# Patient Record
Sex: FEMALE | Race: WHITE | NOT HISPANIC OR LATINO | Employment: FULL TIME | URBAN - METROPOLITAN AREA
[De-identification: names, ages, dates, MRNs, and addresses within clinical notes are randomized per-mention and may not be internally consistent; named-entity substitution may affect disease eponyms.]

---

## 2018-11-01 ENCOUNTER — OFFICE VISIT (OUTPATIENT)
Dept: MEDICAL GROUP | Facility: PHYSICIAN GROUP | Age: 23
End: 2018-11-01
Payer: COMMERCIAL

## 2018-11-01 VITALS
HEART RATE: 124 BPM | DIASTOLIC BLOOD PRESSURE: 68 MMHG | BODY MASS INDEX: 24.4 KG/M2 | WEIGHT: 132.6 LBS | TEMPERATURE: 98 F | SYSTOLIC BLOOD PRESSURE: 102 MMHG | OXYGEN SATURATION: 97 % | HEIGHT: 62 IN

## 2018-11-01 DIAGNOSIS — N39.0 ACUTE UTI: ICD-10-CM

## 2018-11-01 DIAGNOSIS — M54.6 ACUTE LEFT-SIDED THORACIC BACK PAIN: ICD-10-CM

## 2018-11-01 DIAGNOSIS — R30.0 DYSURIA: ICD-10-CM

## 2018-11-01 LAB
APPEARANCE UR: CLEAR
BILIRUB UR STRIP-MCNC: NORMAL MG/DL
COLOR UR AUTO: YELLOW
GLUCOSE UR STRIP.AUTO-MCNC: NORMAL MG/DL
INT CON NEG: NEGATIVE
INT CON POS: POSITIVE
KETONES UR STRIP.AUTO-MCNC: NORMAL MG/DL
LEUKOCYTE ESTERASE UR QL STRIP.AUTO: NORMAL
NITRITE UR QL STRIP.AUTO: NORMAL
PH UR STRIP.AUTO: 6 [PH] (ref 5–8)
POC URINE PREGNANCY TEST: NORMAL
PROT UR QL STRIP: NORMAL MG/DL
RBC UR QL AUTO: NORMAL
SP GR UR STRIP.AUTO: 1.01
UROBILINOGEN UR STRIP-MCNC: 0.2 MG/DL

## 2018-11-01 PROCEDURE — 81025 URINE PREGNANCY TEST: CPT | Performed by: NURSE PRACTITIONER

## 2018-11-01 PROCEDURE — 99204 OFFICE O/P NEW MOD 45 MIN: CPT | Performed by: NURSE PRACTITIONER

## 2018-11-01 PROCEDURE — 81002 URINALYSIS NONAUTO W/O SCOPE: CPT | Performed by: NURSE PRACTITIONER

## 2018-11-01 RX ORDER — NITROFURANTOIN 25; 75 MG/1; MG/1
100 CAPSULE ORAL EVERY 12 HOURS
Qty: 10 CAP | Refills: 0 | Status: SHIPPED | OUTPATIENT
Start: 2018-11-01 | End: 2018-11-01 | Stop reason: CLARIF

## 2018-11-01 RX ORDER — NITROFURANTOIN 25; 75 MG/1; MG/1
100 CAPSULE ORAL 2 TIMES DAILY
Qty: 10 CAP | Refills: 0 | Status: SHIPPED | OUTPATIENT
Start: 2018-11-01 | End: 2018-11-06

## 2018-11-01 RX ORDER — CLINDAMYCIN PHOSPHATE 10 MG/G
GEL TOPICAL 2 TIMES DAILY
COMMUNITY
End: 2023-01-30

## 2018-11-01 ASSESSMENT — PATIENT HEALTH QUESTIONNAIRE - PHQ9: CLINICAL INTERPRETATION OF PHQ2 SCORE: 0

## 2018-11-01 NOTE — ASSESSMENT & PLAN NOTE
Started 10/1. States worse with bending forward, states that it has improved. States pain is sharp constant, lasts approximately 5 minutes at an episode. States that it is worse with lifting. States she was doing a lot more lifting at work when it started. States that she has not taken medication for this issue.

## 2018-11-01 NOTE — ASSESSMENT & PLAN NOTE
"This is a new problem. Patient noticed about 3 months ago burning with urination. States that symptoms were daily, but have improved some.  states she continues to have burning with urination but \"not every time\" . States no odor. States she has had some nausea. No fevers or chills. States some cramping in her lower abdomen.   "

## 2018-11-02 NOTE — PROGRESS NOTES
"Chief Complaint   Patient presents with   • Back Pain     right side middle of the back, hurts to bend down        HISTORY OF THE PRESENT ILLNESS: This is a 23 y.o. female new patient to our practice. This pleasant patient is here today to discuss back pain and dysuria.    Acute left-sided thoracic back pain  Started 10/1. States worse with bending forward, states that it has improved. States pain is sharp constant, lasts approximately 5 minutes at an episode. States that it is worse with lifting. States she was doing a lot more lifting at work when it started. States that she has not taken medication for this issue.     Dysuria  This is a new problem. Patient noticed about 3 months ago burning with urination. States that symptoms were daily, but have improved some.  states she continues to have burning with urination but \"not every time\" . States no odor. States she has had some nausea. No fevers or chills. States some cramping in her lower abdomen.       Past Medical History:   Diagnosis Date   • Allergy    • Granular corneal dystrophy     bilateral       Past Surgical History:   Procedure Laterality Date   • DENTAL EXTRACTION(S)         Family Status   Relation Status   • Mo (Not Specified)   • Fa (Not Specified)     Family History   Problem Relation Age of Onset   • Other Mother         benign lump   • Heart Disease Father 63        MI       Social History   Substance Use Topics   • Smoking status: Never Smoker   • Smokeless tobacco: Never Used   • Alcohol use Yes      Comment: once a week        Allergies: Patient has no known allergies.    Current Outpatient Prescriptions Ordered in [facility]   Medication Sig Dispense Refill   • Etonogestrel (NEXPLANON SC) Inject  as instructed.     • clindamycin (CLEOCIN T) 1 % Gel Apply  to affected area(s) 2 times a day.     • nitrofurantoin monohydr macro (MACROBID) 100 MG Cap Take 1 Cap by mouth 2 times a day for 5 days. 10 Cap 0     No current Epic-ordered " "facility-administered medications on file.        Review of Systems   Constitutional:  Negative for fever, chills, weight loss and malaise/fatigue.   HENT:  Negative for ear pain, nosebleeds, congestion, sore throat and neck pain.    Eyes:  Negative for blurred vision.   Respiratory:  Negative for cough, sputum production, shortness of breath and wheezing.    Cardiovascular:  Negative for chest pain, palpitations, orthopnea and leg swelling.   Gastrointestinal:  Negative for heartburn, nausea, vomiting and abdominal pain.   Genitourinary: Positive for dysuria, urgency and frequency.   Musculoskeletal:  Negative for myalgias, positive back pain and negative joint pain.   Skin:  Negative for rash and itching.   Neurological:  Negative for dizziness, tingling, tremors, sensory change, focal weakness and headaches.   Endo/Heme/Allergies:  Does not bruise/bleed easily.   Psychiatric/Behavioral:  Negative for depression, anxiety, or memory loss.     All other systems reviewed and are negative except as in HPI.    Exam: Blood pressure 102/68, pulse (!) 124, temperature 36.7 °C (98 °F), height 1.575 m (5' 2\"), weight 60.1 kg (132 lb 9.6 oz), SpO2 97 %.  General:  Normal appearing. No distress.  Pulmonary:  Clear to ausculation.  Normal effort. No rales, ronchi, or wheezing.  Cardiovascular:  Regular rate and rhythm without murmur. Carotid and radial pulses are intact and equal bilaterally.  Abdomen:  Soft,  nondistended.  Mild suprapubic tenderness.  Normal bowel sounds. Liver and spleen are not palpable.  Negative CVA tenderness  Neurologic:  Grossly nonfocal  Lymph:  No cervical, supraclavicular or axillary lymph nodes are palpable  Skin:  Warm and dry.  No obvious lesions.  Musculoskeletal:  Normal gait. No extremity cyanosis, clubbing, or edema.  Negative pain to palpation, full range of motion intact  Psych:  Normal mood and affect. Alert and oriented x3. Judgment and insight is normal.    PLAN:    1.Acute left-sided " thoracic back pain  Urinalysis and pregnancy are negative  Discussed supportive measures for muscle strain including ice, ibuprofen, rest  - POCT Urinalysis  - POCT Pregnancy    3. Dysuria  4. Acute UTI  Patient continues to have dysuria, urgency, frequency, states that she has had recent episodes of diarrhea.  - nitrofurantoin monohydr macro (MACROBID) 100 MG Cap; Take 1 Cap by mouth 2 times a day for 5 days.  Dispense: 10 Cap; Refill: 0    Follow-up in 1 month to discuss diarrhea, nausea if this does not improve. Patient is encouraged to be seen in the emergency room for chest pain, palpitations, shortness of breath, dizziness, severe abdominal pain or other concerning symptoms.    Please note that this dictation was created using voice recognition software. I have made every reasonable attempt to correct obvious errors, but I expect that there are errors of grammar and possibly content that I did not discover before finalizing the note.      Assessment/Plan  1. Back pain, unspecified back location, unspecified back pain laterality, unspecified chronicity  POCT Urinalysis    POCT Pregnancy   2. Acute left-sided thoracic back pain     3. Dysuria     4. Acute UTI  nitrofurantoin monohydr macro (MACROBID) 100 MG Cap    DISCONTINUED: nitrofurantoin monohydr macro (MACROBID) 100 MG Cap         I have placed the below orders and discussed them with an approved delegating provider. The MA is performing the below orders under the direction of Dr. Weinberg.

## 2023-01-28 SDOH — ECONOMIC STABILITY: FOOD INSECURITY: WITHIN THE PAST 12 MONTHS, THE FOOD YOU BOUGHT JUST DIDN'T LAST AND YOU DIDN'T HAVE MONEY TO GET MORE.: NEVER TRUE

## 2023-01-28 SDOH — ECONOMIC STABILITY: TRANSPORTATION INSECURITY
IN THE PAST 12 MONTHS, HAS LACK OF TRANSPORTATION KEPT YOU FROM MEETINGS, WORK, OR FROM GETTING THINGS NEEDED FOR DAILY LIVING?: NO

## 2023-01-28 SDOH — ECONOMIC STABILITY: HOUSING INSECURITY: IN THE LAST 12 MONTHS, HOW MANY PLACES HAVE YOU LIVED?: 1

## 2023-01-28 SDOH — ECONOMIC STABILITY: INCOME INSECURITY: IN THE LAST 12 MONTHS, WAS THERE A TIME WHEN YOU WERE NOT ABLE TO PAY THE MORTGAGE OR RENT ON TIME?: NO

## 2023-01-28 SDOH — ECONOMIC STABILITY: TRANSPORTATION INSECURITY
IN THE PAST 12 MONTHS, HAS THE LACK OF TRANSPORTATION KEPT YOU FROM MEDICAL APPOINTMENTS OR FROM GETTING MEDICATIONS?: NO

## 2023-01-28 SDOH — ECONOMIC STABILITY: TRANSPORTATION INSECURITY
IN THE PAST 12 MONTHS, HAS LACK OF RELIABLE TRANSPORTATION KEPT YOU FROM MEDICAL APPOINTMENTS, MEETINGS, WORK OR FROM GETTING THINGS NEEDED FOR DAILY LIVING?: NO

## 2023-01-28 SDOH — HEALTH STABILITY: PHYSICAL HEALTH: ON AVERAGE, HOW MANY DAYS PER WEEK DO YOU ENGAGE IN MODERATE TO STRENUOUS EXERCISE (LIKE A BRISK WALK)?: 2 DAYS

## 2023-01-28 SDOH — ECONOMIC STABILITY: HOUSING INSECURITY
IN THE LAST 12 MONTHS, WAS THERE A TIME WHEN YOU DID NOT HAVE A STEADY PLACE TO SLEEP OR SLEPT IN A SHELTER (INCLUDING NOW)?: NO

## 2023-01-28 SDOH — ECONOMIC STABILITY: FOOD INSECURITY: WITHIN THE PAST 12 MONTHS, YOU WORRIED THAT YOUR FOOD WOULD RUN OUT BEFORE YOU GOT MONEY TO BUY MORE.: NEVER TRUE

## 2023-01-28 SDOH — ECONOMIC STABILITY: INCOME INSECURITY: HOW HARD IS IT FOR YOU TO PAY FOR THE VERY BASICS LIKE FOOD, HOUSING, MEDICAL CARE, AND HEATING?: NOT VERY HARD

## 2023-01-28 SDOH — HEALTH STABILITY: PHYSICAL HEALTH: ON AVERAGE, HOW MANY MINUTES DO YOU ENGAGE IN EXERCISE AT THIS LEVEL?: 20 MIN

## 2023-01-28 SDOH — HEALTH STABILITY: MENTAL HEALTH
STRESS IS WHEN SOMEONE FEELS TENSE, NERVOUS, ANXIOUS, OR CAN'T SLEEP AT NIGHT BECAUSE THEIR MIND IS TROUBLED. HOW STRESSED ARE YOU?: RATHER MUCH

## 2023-01-28 ASSESSMENT — SOCIAL DETERMINANTS OF HEALTH (SDOH)
IN A TYPICAL WEEK, HOW MANY TIMES DO YOU TALK ON THE PHONE WITH FAMILY, FRIENDS, OR NEIGHBORS?: NEVER
IN A TYPICAL WEEK, HOW MANY TIMES DO YOU TALK ON THE PHONE WITH FAMILY, FRIENDS, OR NEIGHBORS?: NEVER
HOW OFTEN DO YOU ATTENT MEETINGS OF THE CLUB OR ORGANIZATION YOU BELONG TO?: PATIENT DECLINED
HOW OFTEN DO YOU ATTENT MEETINGS OF THE CLUB OR ORGANIZATION YOU BELONG TO?: PATIENT DECLINED
HOW HARD IS IT FOR YOU TO PAY FOR THE VERY BASICS LIKE FOOD, HOUSING, MEDICAL CARE, AND HEATING?: NOT VERY HARD
HOW OFTEN DO YOU ATTEND CHURCH OR RELIGIOUS SERVICES?: NEVER
HOW OFTEN DO YOU GET TOGETHER WITH FRIENDS OR RELATIVES?: TWICE A WEEK
HOW OFTEN DO YOU HAVE SIX OR MORE DRINKS ON ONE OCCASION: NEVER
HOW OFTEN DO YOU HAVE A DRINK CONTAINING ALCOHOL: MONTHLY OR LESS
DO YOU BELONG TO ANY CLUBS OR ORGANIZATIONS SUCH AS CHURCH GROUPS UNIONS, FRATERNAL OR ATHLETIC GROUPS, OR SCHOOL GROUPS?: NO
DO YOU BELONG TO ANY CLUBS OR ORGANIZATIONS SUCH AS CHURCH GROUPS UNIONS, FRATERNAL OR ATHLETIC GROUPS, OR SCHOOL GROUPS?: NO
HOW OFTEN DO YOU ATTEND CHURCH OR RELIGIOUS SERVICES?: NEVER
HOW OFTEN DO YOU GET TOGETHER WITH FRIENDS OR RELATIVES?: TWICE A WEEK
WITHIN THE PAST 12 MONTHS, YOU WORRIED THAT YOUR FOOD WOULD RUN OUT BEFORE YOU GOT THE MONEY TO BUY MORE: NEVER TRUE
HOW MANY DRINKS CONTAINING ALCOHOL DO YOU HAVE ON A TYPICAL DAY WHEN YOU ARE DRINKING: 1 OR 2

## 2023-01-28 ASSESSMENT — LIFESTYLE VARIABLES
HOW MANY STANDARD DRINKS CONTAINING ALCOHOL DO YOU HAVE ON A TYPICAL DAY: 1 OR 2
SKIP TO QUESTIONS 9-10: 1
AUDIT-C TOTAL SCORE: 1
HOW OFTEN DO YOU HAVE SIX OR MORE DRINKS ON ONE OCCASION: NEVER
HOW OFTEN DO YOU HAVE A DRINK CONTAINING ALCOHOL: MONTHLY OR LESS

## 2023-01-30 ENCOUNTER — OFFICE VISIT (OUTPATIENT)
Dept: MEDICAL GROUP | Facility: PHYSICIAN GROUP | Age: 28
End: 2023-01-30
Payer: COMMERCIAL

## 2023-01-30 VITALS
TEMPERATURE: 97.8 F | OXYGEN SATURATION: 100 % | DIASTOLIC BLOOD PRESSURE: 76 MMHG | HEART RATE: 82 BPM | SYSTOLIC BLOOD PRESSURE: 112 MMHG | HEIGHT: 63 IN | BODY MASS INDEX: 24.77 KG/M2 | WEIGHT: 139.8 LBS

## 2023-01-30 DIAGNOSIS — Z00.00 WELLNESS EXAMINATION: ICD-10-CM

## 2023-01-30 DIAGNOSIS — H18.533 GRANULAR CORNEAL DYSTROPHY OF BOTH EYES: ICD-10-CM

## 2023-01-30 DIAGNOSIS — Z23 NEED FOR VACCINATION: ICD-10-CM

## 2023-01-30 PROBLEM — M54.6 ACUTE LEFT-SIDED THORACIC BACK PAIN: Status: RESOLVED | Noted: 2018-11-01 | Resolved: 2023-01-30

## 2023-01-30 PROBLEM — N39.0 ACUTE UTI: Status: RESOLVED | Noted: 2018-11-01 | Resolved: 2023-01-30

## 2023-01-30 PROBLEM — R30.0 DYSURIA: Status: RESOLVED | Noted: 2018-11-01 | Resolved: 2023-01-30

## 2023-01-30 PROCEDURE — 90686 IIV4 VACC NO PRSV 0.5 ML IM: CPT

## 2023-01-30 PROCEDURE — 99385 PREV VISIT NEW AGE 18-39: CPT | Mod: 25

## 2023-01-30 PROCEDURE — 90471 IMMUNIZATION ADMIN: CPT

## 2023-01-30 ASSESSMENT — PATIENT HEALTH QUESTIONNAIRE - PHQ9: CLINICAL INTERPRETATION OF PHQ2 SCORE: 0

## 2023-01-30 ASSESSMENT — ENCOUNTER SYMPTOMS
CONSTIPATION: 0
ABDOMINAL PAIN: 1
DIARRHEA: 0
BLOOD IN STOOL: 0

## 2023-01-30 NOTE — ASSESSMENT & PLAN NOTE
Chronic, stable. Sees provider at [facility] for monitoring/pain management with steroid eye drop as needed.

## 2023-01-30 NOTE — PROGRESS NOTES
"Subjective:     CC: Pt presents today to establish care with me. Also under the care of Dr. Snatiago OBGYIJEOMA. Reports she has been off of her OCP as her and her  are trying to conceive for about 5 months. Complains of abdominal pain, intermittent low abdomen cramping, not associated with menses. Not present today, did have one episode of abnormal BM.     HPI:   Alina presents today with    Problem   Granular Corneal Dystrophy of Both Eyes   Acute Left-Sided Thoracic Back Pain (Resolved)   Dysuria (Resolved)   Acute Uti (Resolved)       Health Maintenance: Completed  Cancer screening:   Cervical cancer screening: done within the past 3-5 years with Dr. Santiago, will request records    Infectious disease screening/Immunizaitons  -STI screening: declines  Practices safe sex.  -HIV Screening: declines  -Immunizaitons:   Influenza: 1/30/23  Tetanus: up-to-date: 6/8/29 due  Anticipatory Guidance:  Diet: Breakfast: scones and coffee, Lunch: tacos, Dinner: jambalaya  Elicits she is eating a variety of fresh veggies/fruits, elicits eating a variety of meats,   Limited fast food/junk food- once weekly  Soda: none; Water: at least 16 oz daily  Exercise: recommended 150 minutes/week; walking- works at [employer], likes swimming.  Substance Abuse: no  Safe in relationship. Yes/  Seat belts, bike helmet, gun safety discussed.  Sun protection used.    ROS:  Review of Systems   Gastrointestinal:  Positive for abdominal pain (pain worse with bearing down.). Negative for blood in stool, constipation and diarrhea.   Genitourinary:         Reports history of very heavy periods, since being off BCP, her menses has had irregular intervals, normal flow, moderate cramping.   All other systems reviewed and are negative.    Objective:     Exam:  /76 (BP Location: Left arm, Patient Position: Sitting, BP Cuff Size: Small adult)   Pulse 82   Temp 36.6 °C (97.8 °F) (Temporal)   Ht 1.595 m (5' 2.8\")   Wt 63.4 kg (139 lb 12.8 oz)  "  LMP 01/27/2023 (Approximate)   SpO2 100%   BMI 24.93 kg/m²  Body mass index is 24.93 kg/m².    Physical Exam  Vitals reviewed.   Constitutional:       General: She is not in acute distress.     Appearance: Normal appearance. She is not ill-appearing.   HENT:      Head: Normocephalic and atraumatic.      Right Ear: Tympanic membrane, ear canal and external ear normal.      Left Ear: Tympanic membrane, ear canal and external ear normal.   Eyes:      Extraocular Movements: Extraocular movements intact.      Conjunctiva/sclera: Conjunctivae normal.      Pupils: Pupils are equal, round, and reactive to light.   Neck:      Thyroid: No thyromegaly.      Trachea: Trachea normal.   Cardiovascular:      Rate and Rhythm: Normal rate and regular rhythm.      Pulses: Normal pulses.      Heart sounds: Normal heart sounds. No murmur heard.  Pulmonary:      Effort: Pulmonary effort is normal.      Breath sounds: Normal breath sounds.   Abdominal:      General: Bowel sounds are normal.      Palpations: Abdomen is soft.   Musculoskeletal:         General: No swelling, tenderness or deformity. Normal range of motion.      Cervical back: Full passive range of motion without pain.   Lymphadenopathy:      Cervical: No cervical adenopathy.   Skin:     General: Skin is warm and dry.      Capillary Refill: Capillary refill takes less than 2 seconds.   Neurological:      General: No focal deficit present.      Mental Status: She is alert and oriented to person, place, and time.      Cranial Nerves: No cranial nerve deficit.      Sensory: No sensory deficit.      Motor: No weakness.   Psychiatric:         Mood and Affect: Mood normal.         Behavior: Behavior normal.     Assessment & Plan:     27 y.o. female with the following -     Problem List Items Addressed This Visit       Granular corneal dystrophy of both eyes     Chronic, stable. Sees provider at [facility] for monitoring/pain management with steroid eye drop as needed.           Other Visit Diagnoses       Need for vaccination        Relevant Orders    INFLUENZA VACCINE QUAD INJ (PF)    Wellness examination        Relevant Orders    HEMOGLOBIN A1C    VITAMIN D,25 HYDROXY (DEFICIENCY)    TSH    Comp Metabolic Panel    CBC WITHOUT DIFFERENTIAL    Lipid Profile          I have placed the below orders and discussed them with an approved delegating provider.  The MA is performing the below orders under the direction of Dr. Gabriel.    I spent a total of 32 minutes with record review, exam, communication with the patient, communication with other providers, and documentation of this encounter.    Return in about 1 year (around 1/30/2024), or if symptoms worsen or fail to improve.    Please note that this dictation was created using voice recognition software. I have made every reasonable attempt to correct obvious errors, but I expect that there are errors of grammar and possibly content that I did not discover before finalizing the note.

## 2023-02-16 ENCOUNTER — HOSPITAL ENCOUNTER (OUTPATIENT)
Dept: LAB | Facility: MEDICAL CENTER | Age: 28
End: 2023-02-16
Payer: COMMERCIAL

## 2023-02-16 DIAGNOSIS — Z00.00 WELLNESS EXAMINATION: ICD-10-CM

## 2023-02-16 LAB
25(OH)D3 SERPL-MCNC: 22 NG/ML (ref 30–100)
ALBUMIN SERPL BCP-MCNC: 4.5 G/DL (ref 3.2–4.9)
ALBUMIN/GLOB SERPL: 1.8 G/DL
ALP SERPL-CCNC: 92 U/L (ref 30–99)
ALT SERPL-CCNC: 12 U/L (ref 2–50)
ANION GAP SERPL CALC-SCNC: 8 MMOL/L (ref 7–16)
AST SERPL-CCNC: 16 U/L (ref 12–45)
BILIRUB SERPL-MCNC: 0.9 MG/DL (ref 0.1–1.5)
BUN SERPL-MCNC: 12 MG/DL (ref 8–22)
CALCIUM ALBUM COR SERPL-MCNC: 8.9 MG/DL (ref 8.5–10.5)
CALCIUM SERPL-MCNC: 9.3 MG/DL (ref 8.5–10.5)
CHLORIDE SERPL-SCNC: 107 MMOL/L (ref 96–112)
CHOLEST SERPL-MCNC: 168 MG/DL (ref 100–199)
CO2 SERPL-SCNC: 26 MMOL/L (ref 20–33)
CREAT SERPL-MCNC: 0.55 MG/DL (ref 0.5–1.4)
ERYTHROCYTE [DISTWIDTH] IN BLOOD BY AUTOMATED COUNT: 37.7 FL (ref 35.9–50)
EST. AVERAGE GLUCOSE BLD GHB EST-MCNC: 108 MG/DL
FASTING STATUS PATIENT QL REPORTED: NORMAL
GFR SERPLBLD CREATININE-BSD FMLA CKD-EPI: 128 ML/MIN/1.73 M 2
GLOBULIN SER CALC-MCNC: 2.5 G/DL (ref 1.9–3.5)
GLUCOSE SERPL-MCNC: 79 MG/DL (ref 65–99)
HBA1C MFR BLD: 5.4 % (ref 4–5.6)
HCT VFR BLD AUTO: 45.2 % (ref 37–47)
HDLC SERPL-MCNC: 56 MG/DL
HGB BLD-MCNC: 15 G/DL (ref 12–16)
LDLC SERPL CALC-MCNC: 102 MG/DL
MCH RBC QN AUTO: 30 PG (ref 27–33)
MCHC RBC AUTO-ENTMCNC: 33.2 G/DL (ref 33.6–35)
MCV RBC AUTO: 90.4 FL (ref 81.4–97.8)
PLATELET # BLD AUTO: 234 K/UL (ref 164–446)
PMV BLD AUTO: 11.1 FL (ref 9–12.9)
POTASSIUM SERPL-SCNC: 4.7 MMOL/L (ref 3.6–5.5)
PROT SERPL-MCNC: 7 G/DL (ref 6–8.2)
RBC # BLD AUTO: 5 M/UL (ref 4.2–5.4)
SODIUM SERPL-SCNC: 141 MMOL/L (ref 135–145)
TRIGL SERPL-MCNC: 50 MG/DL (ref 0–149)
TSH SERPL DL<=0.005 MIU/L-ACNC: 0.87 UIU/ML (ref 0.38–5.33)
WBC # BLD AUTO: 5 K/UL (ref 4.8–10.8)

## 2023-02-16 PROCEDURE — 80053 COMPREHEN METABOLIC PANEL: CPT

## 2023-02-16 PROCEDURE — 85027 COMPLETE CBC AUTOMATED: CPT

## 2023-02-16 PROCEDURE — 36415 COLL VENOUS BLD VENIPUNCTURE: CPT

## 2023-02-16 PROCEDURE — 84443 ASSAY THYROID STIM HORMONE: CPT

## 2023-02-16 PROCEDURE — 83036 HEMOGLOBIN GLYCOSYLATED A1C: CPT

## 2023-02-16 PROCEDURE — 80061 LIPID PANEL: CPT

## 2023-02-16 PROCEDURE — 82306 VITAMIN D 25 HYDROXY: CPT

## 2023-06-28 ENCOUNTER — HOSPITAL ENCOUNTER (OUTPATIENT)
Facility: MEDICAL CENTER | Age: 28
End: 2023-06-28
Payer: COMMERCIAL

## 2023-07-06 ENCOUNTER — OFFICE VISIT (OUTPATIENT)
Dept: MEDICAL GROUP | Facility: PHYSICIAN GROUP | Age: 28
End: 2023-07-06
Payer: COMMERCIAL

## 2023-07-06 VITALS
TEMPERATURE: 97.6 F | OXYGEN SATURATION: 96 % | HEIGHT: 62 IN | HEART RATE: 92 BPM | WEIGHT: 139.4 LBS | DIASTOLIC BLOOD PRESSURE: 68 MMHG | BODY MASS INDEX: 25.65 KG/M2 | SYSTOLIC BLOOD PRESSURE: 112 MMHG

## 2023-07-06 DIAGNOSIS — Z3A.21 21 WEEKS GESTATION OF PREGNANCY: ICD-10-CM

## 2023-07-06 DIAGNOSIS — R11.14 BILIOUS VOMITING WITH NAUSEA: ICD-10-CM

## 2023-07-06 PROCEDURE — 99213 OFFICE O/P EST LOW 20 MIN: CPT

## 2023-07-06 PROCEDURE — 3078F DIAST BP <80 MM HG: CPT

## 2023-07-06 PROCEDURE — 3074F SYST BP LT 130 MM HG: CPT

## 2023-07-06 ASSESSMENT — ENCOUNTER SYMPTOMS
NAUSEA: 1
ABDOMINAL PAIN: 1

## 2023-07-06 ASSESSMENT — FIBROSIS 4 INDEX: FIB4 SCORE: 0.55

## 2023-07-06 NOTE — PROGRESS NOTES
"Subjective:     CC: Diagnoses of Bilious vomiting with nausea and 21 weeks gestation of pregnancy were pertinent to this visit.    HPI:   Alina presents today with    Problem   Bilious Vomiting With Nausea   21 Weeks Gestation of Pregnancy     ROS:  Review of Systems   Gastrointestinal:  Positive for abdominal pain and nausea.   All other systems reviewed and are negative.      Objective:     Exam:  /68 (BP Location: Left arm, Patient Position: Sitting, BP Cuff Size: Adult)   Pulse 92   Temp 36.4 °C (97.6 °F) (Temporal)   Ht 1.575 m (5' 2\")   Wt 63.2 kg (139 lb 6.4 oz)   LMP 01/27/2023 (Approximate)   SpO2 96%   BMI 25.50 kg/m²  Body mass index is 25.5 kg/m².    Physical Exam  Vitals reviewed.   Constitutional:       General: She is not in acute distress.     Appearance: She is not ill-appearing.   HENT:      Head: Normocephalic and atraumatic.   Cardiovascular:      Rate and Rhythm: Normal rate.      Pulses: Normal pulses.   Pulmonary:      Effort: Pulmonary effort is normal. No respiratory distress.   Abdominal:      General: Bowel sounds are normal. There is no distension.      Tenderness: There is no abdominal tenderness. There is no guarding.      Comments: Pregnancy, 21 weeks   Skin:     General: Skin is warm and dry.   Neurological:      General: No focal deficit present.      Mental Status: She is alert and oriented to person, place, and time.   Psychiatric:         Mood and Affect: Mood normal.         Behavior: Behavior normal.       Assessment & Plan:     28 y.o. female with the following -     Problem List Items Addressed This Visit       Bilious vomiting with nausea     Acute, unstable. Onset 3 days ago, one episode of vomiting entire meal, no emesis since this time, but transient nausea. Has been tolerating fluids, foods currently.   Recommend continue hydration, b-6 and doxylamine as she already has these at home from first trimester, anabella for symptom relief.   If worsening symptoms, " concern for reduced fetal movement, recommend prenatal visit/ER evaluation.         21 weeks gestation of pregnancy     Stable. Feeling movement regularly. Seeing dr. Shipman working for this. US last week.          Patient was educated in proper administration of medication(s) ordered today including safety, possible SE, risks, benefits, rationale and alternatives to therapy.   Supportive care, differential diagnoses, and indications for immediate follow-up discussed with patient.    Pathogenesis of diagnosis discussed including typical length and natural progression.    Instructed to return to clinic or nearest emergency department for any change in condition, further concerns, or worsening of symptoms.  Patient states understanding of the plan of care and discharge instructions.    Return if symptoms worsen or fail to improve.    Please note that this dictation was created using voice recognition software. I have made every reasonable attempt to correct obvious errors, but I expect that there are errors of grammar and possibly content that I did not discover before finalizing the note.

## 2023-07-06 NOTE — ASSESSMENT & PLAN NOTE
Acute, unstable. Onset 3 days ago, one episode of vomiting entire meal, no emesis since this time, but transient nausea. Has been tolerating fluids, foods currently.   Recommend continue hydration, b-6 and doxylamine as she already has these at home from first trimester, anabella for symptom relief.   If worsening symptoms, concern for reduced fetal movement, recommend prenatal visit/ER evaluation.

## 2023-12-07 ENCOUNTER — NON-PROVIDER VISIT (OUTPATIENT)
Dept: OBGYN | Facility: CLINIC | Age: 28
End: 2023-12-07
Payer: COMMERCIAL

## 2023-12-07 NOTE — PROGRESS NOTES
Summary: Alina reports baby had not lost a concerning amount of weight at his first appointment but did not gain weight by the 2 week appointment. At that time the pediatrician gave her a nipple shield and recommended pumping after breastfeeding and feeding all pumped milk back to the baby. He gained 4oz in 1 week. Now, breastfeeding every 2 hours during the day, every 3-4 hours at night. Waking baby for most feedings. Offering both breasts, up to 15 minutes on each side. Offering 2oz of formula after 3 feedings daily. No longer pumping due to feeling overwhelmed and yielding small volumes of milk. No longer using the nipple shield.     Today: Baby last fed 2 hours prior to appointment. Latched to both sides, cross cradle with ease and no nipple pain. George transferred 22mls from the right breast and 20mls from the left breast. Offered 40mls of breastmilk and formula, mother paces well. Pumped for 9 minutes, yielded 10mls total.   Plan: Breastfeed every 2-2.5 hours throughout the day, no need to wake for nighttime feedings. Offer both breasts at every feeding and top off with 1oz of formula. Discussed pumping after some feedings to determine if George is consistently transferring milk efficiently. Not recommended to pump after all feedings.     Follow up:   Lactation appointment:  December 15, 2023  Baby 's Provider appointment:  2023  Referrals: None    Maternal Diagnosis/Problem:  Lactation Suppression  and Lactation Problem  Infant Diagnosis/Problem:  Slow weight gain of     Subjective:     Alina Singh is a 28 y.o. female here for lactation care. She is here today with  babyGeorge .    Concerns:   Maternal: Weight check, Infant feeding evaluation, and Breastfeeding questions   Infant: History of slow gain    HPI:   Pertinent  history: c/section at 39 weeks    Mother does not have a history of advanced maternal age, GDM, hypertension prior to pregnancy, GHTN, insulin  resistance, multiple gestation, PCOS, thyroid disease, auto immune disease , placenta encapsulation, and breast surgery    Breast changes in pregnancy: Yes  History of breast surgeries: No    FEEDING HISTORY:    Previous Breastfeeding History: First baby.   Hospital Course: Delivered at [facility]. Reports pain and damaged nipples while in the hospital.   Currently 12/7/2023: Alina reports baby had not lost a concerning amount of weight at his first appointment but did not gain weight by the 2 week appointment. At that time the pediatrician gave her a nipple shield and recommended pumping after breastfeeding and feeding all pumped milk back to the baby. He gained 4oz in 1 week. Now, breastfeeding every 2 hours during the day, every 3-4 hours at night. Waking baby for most feedings. Offering both breasts, up to 15 minutes on each side. Offering 2oz of formula after 3 feedings daily. No longer pumping due to feeling overwhelmed and yielding small volumes of milk. No longer using the nipple shield.       Both breasts: Yes    Supplement: Formula  Quantity: 6oz / 24 hours  How given/devices: Bottle  Bottle/nipple type: Dr. Brown, Level 1    Nipple Shield Use: No longer using     Breast Pumping:  No longer pumping   Type of Pump: Medela  Flange size/type: 24mm  Wearable: No  NO pain with pumping    Maternal ROS:  Constitutional: No fever, chills. Feeling well  Breasts: No soreness of breasts and No soreness of nipples  Psychiatric: Managing ok  Mental Health: No mention of feeling irritable, agitated, angry, overwhelmed, apathetic, appetite changes, exhausted nor having sleep changes outside infant feeds/demands.  Current Outpatient Medications on File Prior to Visit   Medication Sig Dispense Refill    multivitamin Tab Take 1 Tablet by mouth every day. Eye multivitamin      Prenatal Vit-DSS-Fe Cbn-FA (PRENATAL AD PO) Take  by mouth.       No current facility-administered medications on file prior to visit.      Past  Medical History:   Diagnosis Date    Acute left-sided thoracic back pain 11/1/2018    Acute UTI 11/1/2018    Allergy     Granular corneal dystrophy     bilateral       Objective:     Maternal Physical Lactation Exam  General: No acute distress  Breasts: Symmetrical  and Soft  Nipples: intact  Psychiatric: Normal mood and affect. Her behavior is normal. Judgment and thought content normal.  Mental Health: Did NOT exhibit sadness, crying, feeling overwhelmed, agitation or hypervigilance.    Assessment/Plan & Lactation Counseling:     Infant Exam on Infant Chart    Infant Weight History:   11/05/2023: 6# 15oz  12/07/2023: 7# 3.1oz    Infant Intake at Breast:  R  22mls     L  20mls    Total: 42mls  Milk Transfer at this feeding:   Effective breastfeeding for volume available      Pumped:   Type of Pump: Medela   Quantity Pumped:   Total: 10mls  Initiation of Feeding: Infant initiates  Position of Feeding:    Right: cross cradle  Left: cross cradle  Attachment Achieved: rapidly  Nipple shield: N/A       Suck Pattern at the Breast: Suck burst and normal rest  Suck Pattern on the Bottle: Suck burst and normal rest  Behavior Following Observed Feeding: content  Nipple Pain: None     Latch: Mom latches independently  Suckling/Feeding: attaches, audible swallows, baby falls asleep, baby fed effectively, baby roots, elicits RADHA, intermittent swallows, and rhythmic  Sucking strength: Moderate Strong  Sucking Rhythm Coordinated   Compression: WNL    Once latched, baby fell into a mature and fully integrated SSB pattern.    Swallowing No difficulty noted  If functional feeding, it is quiet, rhythmic, coordinated, organized, effeicent safe, satisfying and pleasurable for both parent and baby? Yes, mostly with improvement   Milk Supply Available: low and delayed    Low Milk Supply:   Likely due to: delay in lactogenesis II and ineffective or infrequent breast stimulation or milk removal in the first 2 weeks.      MATERNAL  PERSONALIZED BREASTFEEDING PLAN  Discussed concerns and symptoms as listed above in assessment and guidance summarized below. Shared decision making was used between myself and the family for this encounter, home care, and follow up. Topics reviewed included:   4th Trimester: The 12-week period immediately after mom has had the baby. Not everyone has heard of it, but every mother and their  baby will go through it. It is a time of great physical and emotional change as the baby adjusts to being outside the womb, and the family adjusts to new life as parents  During the fourth trimester, one can expect fussiness and crying from the baby and very likely exhaustion for the family.  babies are learning to adjust to life outside the womb where it was warm and squishy!  There is a lot of misinformation about babies and their needs, and parents are often encouraged to ignore baby's signals. Bad idea. Babies are “half-baked” at birth and have much to learn with the help of physical and emotional support from caregivers. Taking care of a baby's needs is an investment that pays off with a happier, healthier child and adult.  It can take weeks or even months for your body to feel totally normal again. There is a major hormonal upheaval experienced by moms in the first few weeks after birth, because their bodies are shifting from many pregnancy hormones to a more normal hormonal make-up.  These first three months with baby earth side is a delicate time. Honor it with a mindful dose of support. Mindful Mamma's is an latha that may help.   Self-Compassion through Relational Support and Interaction.   Be kind to ourself. This is the first step in reducing anxiety and depression. Pay attention to how you are doing.   What do you need? Food, Rest, Sleep, Support, to Laugh/giggle: who will you ask today? They want to help you. We want to help you.   How do you stop your self-blame, or your self-criticism?   Get out of the  "house each day, walk or drive somewhere or ask a friend to drive you,  a \"treat\" at a drive through.   Mood and Anxiety Disorders: Having a new baby can be joyful time for some new moms, but a difficult time for others. For all, it is a time of profound physical and emotional change. Balancing baby, family, partners and friends, work, pets, and preexisting or new life stressors as well as sleep deprivation can be extremely challenging. Untreated depression, sleep exhaustion, and anxiety are each a challenge that must be addressed and in addition can contribute to early cessation of breastfeeding. It is important both for the mental health and physical health of new moms and babies to get on the path to feeling better and if therapeutic support is needed, specific resources are available.   Milk Supply is dependent on glandular tissue development, hormonal influences, how many times the baby/pump removes milk and how well the breasts are emptied in a 24 hour period. This is a biological reality that we can NOT work around. There's no magic trick, tea, food, drink, cookie or supplement that will increase your milk supply. One  must  effectively remove milk to continue to make and maximize milk. In the early days and weeks that can be 8+ times in 24 hours. For older babies, on average 6-7 + times in 24 hours.    Hydration: Staying hydrated is important however lack of hydration is usually not a cause of significantly low milk production.  Everyone needs a different amount of water, depending on their activity and diet. A high salt and/or high-protein diet, high physical activity, or very warm weather/sweating will require more fluids. A person eating a diet high in veggies and fruit, with a lack of physical activity will require fewer fluids. There is no magic number for the # of ounces of water each day.The best way to know that you are well hydrated is by looking at your urine.  Urine should look " clear to light pale yellow, and you should need to pee at least every 3 to 4 hours unless you have a large bladder capacity.  Herbs and Medications: A galactagogue is an herb or medication taken by a breastfeeding mother to increase her milk supply. We know that for centuries mothers around the world have sought out remedies to increase their milk supply. However, there is limited research on the safety and effectiveness of herbal galactagogues, which makes it hard for us to endorse them. It is not known if any of these herbs are truly effective, and it is difficult to predict how a specific herbal galactagogue will affect an individual, requiring “trial and error” in many situations. When effective, results are generally seen within 24-72 hours of starting an herbal galactagogue. Many of these herbs are used to decrease high blood sugar. If you are diabetic or have problems with low blood sugar, or thyroid disease you may not be a candidate for herbs. Not all women can increase their low milk supply with a galactagogue due to the many underlying causes of low milk production.  When taking a galactagogue, remember that frequent milk removal is still the most effective way to increase supply.   Feeding:   Infant difficulty with feeding, slow growth. Guidelines to follow:  Feed your baby every 2-2.5 hours, more often if baby acts hungry.   Awaken baby for feeding if going over 3 hours in the day.   No need to wake baby for nighttime feedings.   Daily goal: 8-12 feedings per 24 hours.   Supplement:   Supplement with expressed milk/formula  Offer 1oz after all daytime feeding  Can offer an additional .5-1oz if needed  If not breastfeeding, offer 2.5-3oz   Proper powder formula preparation: https://www.cdc.gov/nutrition/downloads/prepare-store-powered-vzevri-vjtdmmj-466.pdf.   For babies under 2 months: https://www.cdc.gov/cronobacter/infection-and-infants.html  Pacing the feeding:  A slow flow nipple helps, but how you  feed the baby is more important.  How you are  positioning the bottle can compensate for a faster flow nipple.  When bottle-feeding, the baby should control how much is consumed at a feeding.  Holding the baby in an upright position with the bottle horizontal ensures that the baby gets milk only when sucking.  Here is a nice video demonstrating this concept of paced bottle feeding,  https://www.youtube.com/watch?v=QdWNW8qYV7N Think baby led, not parent led.  Pacifier Use:  The American Academy of Pediatrics' Position Paper reports: Although we recommend a conservative approach regarding pacifier use, we do not endorse a complete ban on the use of pacifiers, nor do we support an approach that induces parental guilt concerning their choices about the use of pacifiers. Pacifier use and breastfeeding in term and  newborns- a systematic review and meta-analysis from the  J of Pediatrics Published online 2022. Has found that when pacifiers are used among individuals who have been counseled on the risks, do not interfere with breastfeeding exclusivity or duration. These are parental choices.   Pumping Guidelines:  Both breasts after some feedings  Type of Pump:  Medela  Power Pumping is only rarely indicated as the response is not significant or zero over 24 hours  How long will vary woman to woman, typically 8-15 minutes, or 1-2 minutes after flow slows  Flange Fit: Freely moving nipple in the tunnel with some movement of the areola.  Today's evaluation indicates a flange change would be appropriate, will monitor to determine if smaller flange is needed.   Caution with Breast Massage: The word “Massage” means different things in the breastfeeding world. It is described and interpreted in so many different ways and unfortunately potentially harmful. The hands can be safe on a breast and  gentle to help in many ways but they also can be damaging when used in the wrong way.  Lymphatic drainage massage  is appropriate,  open hand , lightly stroking only, and can be highly effective. The massage advice to knead , massage deeply , vibrate with marketed tools is  most concerning. Be gentle with this gland to not increase inflammation.   Storage (Acceptable guidelines for healthy term babies)  10 hours at room temp including your pieces, may rinse but not mandatory  8 days refrigerator, don't need  to refrigerate right away if using fresh pumped milk at the next feeding  Freezer 1 year  Deep freezer 2 years  American Academy or Pediatrics has said you may mix different temperature milks.   If baby drinks breast milk from a fresh or refrigerated bottle of breast milk,  you may return the unused portion to the refrigerator and use once at next feeding.   Increasing supply besides Galactogogue's and Pumping:  Warmth  Relaxation   Physical, auditory narratives  Childbirth relaxation Techniques  Acupuncture and acupressure  [facility] Acupuncture  [facility]  Shoulder Massages  Take a nap  Answers to FAQs: https://www.infantrisk.com/category/breastfeeding  Alcohol & Breastfeeding: What's your time-to-zero?  Cough & Cold Medications while Breastfeeding  Vitamin D Supplementation and Breastfeeding  Antidepressant Use While Breastfeeding: What should I know?  Breastfeeding, Caffeine, and Energy Drinks  Recommended resources:  Physicians guide to breastfeeding, must up to date and accurate information available on breastfeeding. https://physicianguidetobreastfeeding.org/  Dads  Step #1 (for Partners): If possible, arrange your life so you can engage with your baby early and often                                                                                 Step #2 (for Moms): Encourage your partner to be hands-on - and let him handle things differently.                                                                                            Step #3 (for Partners): Realize  that “your way” of parenting is essential for your child. https://doi.org/10.1093/cercor/bzgx372  Connect with other mothers:  Facebook:   [place] Breast[facility]: https://www.facebook.com/[place].breastfeeds/  Well-Nourished Babies (Private group for questions and support): https://www.facebook.com/groups/840947676125155/  Breastfeeding [place] including opportunity to weight your baby and do before and after weights   Spoiler alert!  Parenting can be really hard. There is so much to learn when it comes to caring for small humans.  It can feel lonely and unsettling.  Our group, is a parenting and feeding support group that's all about feeding/growing casimiro.   Babies welcome.   Questions expected.   We will help you find your village!    Tuesdays 10am - 11am. Women's Health at [address] [place] [address], [facility] [address] [place] [address], 3rd floor conference room  Check your baby's weight, do a feeding and see how your baby is growing, visit with other mothers, plan on a walk or coffee date after group.  Please download Growth: Baby and Child for Apple or Child Growth Tracker for [place] if you would like to  document and follow your baby's growth curve.  Due to space limitations - limit strollers please (New c/section moms please use your stroller).  We would love to have dads stay, but moms won't breastfeed if there are men in the room, sorry.  The room is generally scheduled for another event following group.  Please take all diapers with you   ONLINE SUPPORT GROUPS  [employer]partum [employer] (PSI) support groups are conducted using a lvml-hz-rlya support model and are not intended for those experiencing a mental health crisis. Groups are 90 minutes (1.5 hours) in length. The first ~30 minutes is providing information, education, and establishing group guidelines. The next ~60 minutes is “talk time,” in which group members share and talk with each other. Group members must be present for the group guidelines before joining in the discussion  or “talk time.”       In Conclusion:   Managing breastfeeding and milk supply is very dynamic,can be a complex and intimate journey, and is not one size fits all. When obstacles present themselves, it takes confidence, persistence and support. The rights of the child include optimal nutrition and mothers need help to make informed decisions. You  and your baby have been screened for biological, psychological, and social risk factors that might interfere with achieving your goals.  Support is critical. We want the family thriving not just tolerating life. You are now the focus of our Breastfeeding Medicine team; we are here to support your decisions and vision.     Follow up requires close monitoring in this time sensitive window of opportunity to establish milk supply and facilitate the learning of  breastfeeding.    Please call [phone] our voicemail line or the front office at [phone] to scheduled your next appointment.  Family is encouraged to e-mail or mychart us to update how the plan is working.    A total of 70 minutes, including infant assessment time,  was spent preparing to see the patient, obtaining and reviewing separately obtained history, performing a medically appropriate examination and evaluation, counseling and educating the family, referring and communicating with other health care professionals, documenting clinical information in the electronic health record, independently interpreting weighted feeds and infant growth results, communicating these results to the family and care coordination as detailed in the above note.       Ramonita Mancilla

## 2023-12-15 ENCOUNTER — OFFICE VISIT (OUTPATIENT)
Dept: OBGYN | Facility: CLINIC | Age: 28
End: 2023-12-15
Payer: COMMERCIAL

## 2023-12-15 DIAGNOSIS — O92.5 LACTATION SUPPRESSION: ICD-10-CM

## 2023-12-15 DIAGNOSIS — O92.70 LACTATION PROBLEM: ICD-10-CM

## 2023-12-15 PROCEDURE — 99215 OFFICE O/P EST HI 40 MIN: CPT | Performed by: NURSE PRACTITIONER

## 2023-12-15 PROCEDURE — G2212 PROLONG OUTPT/OFFICE VIS: HCPCS | Performed by: NURSE PRACTITIONER

## 2023-12-15 NOTE — PROGRESS NOTES
Summary: Baby George has struggled with weight gain since the 2 week appointment, mom went through a nipple shield, pumping and formula, trying to breastfeed along the way. She is currently breastfeeding every two hours, topping off with an ounce of formula after 6 feedings, no top off at night. Xin has started gaining well since the last lactation appointment. Mom prefers not to pump and had only been getting about 10ml after a feeding. However yesterday she pumped 3x and got a total of an ounce. Mom would like to breastfeed and not use formula. She returns to work in 6 weeks and is concerned about supply for returning to work.   Today: Mom independently latched him on the right side, in 14 minutes he removed 24ml, falling asleep.  Offered the left side for 18ml, falling asleep and then did an oral exam with some suck training and demonstrating excercises to the mom. Baby back to breast for an additional 12ml, removing a total of 1.8oz. Last appointment he removed 42ml. Pumping not indicated.   Plan: Continue breastfeeding, may either offer an ounce 5 times per day or two 2.5oz bottles and pump during those times. Pumping will help empty you better than he is currently - although clearly doing better based on good gain - and then less pumping throughout the day and breastfeed at night as doing. Baby takes one 5 hour sleep. Exercises in the note 3-5x/day one minute max each time.   Follow up:   Lactation appointment:  23 12noon  Baby 's Provider appointment: 2 Month Well Child Check  24  Referrals: None    Maternal Diagnosis/Problem:  Lactation Suppression  and Lactation Problem  Infant Diagnosis/Problem:   difficulties feeding at the breast  and Slow weight gain of  improving    Subjective:     Alina Singh is a 28 y.o. female here for lactation care. She is here today with baby.    Concerns:   Maternal: Latch on difficulties , Feeling that there is not enough milk , Weight  "check, Infant feeding evaluation, and Breastfeeding questions   Infant: Baby always seems hungry, Slow weight gain , and Infant \"tongue tied\" questions    HPI:   Pertinent  history: c/section 39.0 weeks     Mother does not have a history of advanced maternal age, GDM, hypertension prior to pregnancy, GHTN, insulin resistance, multiple gestation, PCOS, thyroid disease, auto immune disease , placenta encapsulation, and breast surgery     Breast changes in pregnancy: Yes  History of breast surgeries: No     FEEDING HISTORY:    Previous Breastfeeding History: First baby.   Hospital Course: Delivered at [facility]. Reports pain and damaged nipples while in the hospital.   Prior to consultation on 2023: Alina reports baby had not lost a concerning amount of weight at his first appointment but did not gain weight by the 2 week appointment. At that time the pediatrician gave her a nipple shield and recommended pumping after breastfeeding and feeding all pumped milk back to the baby. He gained 4oz in 1 week. Now, breastfeeding every 2 hours during the day, every 3-4 hours at night. Waking baby for most feedings. Offering both breasts, up to 15 minutes on each side. Offering 2oz of formula after 3 feedings daily. No longer pumping due to feeling overwhelmed and yielding small volumes of milk. No longer using the nipple shield.     Currently 12/15/2023  Jesus Vazquez has struggled with weight gain since the 2 week appointment, mom went through a nipple shield, pumping and formula, trying to breastfeed along the way. She is currently breastfeeding every two hours, topping off with an ounce of formula after 6 feedings, no top off at night. Xni has started gaining well since the last lactation appointment. Mom prefers not to pump and had only been getting about 10ml after a feeding. However yesterday she pumped 3x and got a total of an ounce. Mom would like to breastfeed and not use formula. She returns to " work in 6 weeks and is concerned about supply for returning to work.   Both breasts: Yes     Supplement: Formula  Quantity: 6oz / 24 hours  How given/devices: Bottle  Bottle/nipple type: Dr. Brown, Level 1     Nipple Shield Use: From ped office, no longer using      Breast Pumping:  Varies, did try yesterday after three feedings for 30ml each time  Type of Pump: Medela  Flange size/type: 24mm  Wearable: No  NO pain with pumping    Maternal ROS:  Constitutional: No fever, chills. Feeling well  Breasts: No soreness of breasts and No soreness of nipples  Psychiatric: Managing ok  Mental Health: No mention of feeling irritable, agitated, angry, overwhelmed, apathetic, appetite changes, exhausted nor having sleep changes outside infant feeds/demands.  Current Outpatient Medications on File Prior to Visit   Medication Sig Dispense Refill    multivitamin Tab Take 1 Tablet by mouth every day. Eye multivitamin      Prenatal Vit-DSS-Fe Cbn-FA (PRENATAL AD PO) Take  by mouth.       No current facility-administered medications on file prior to visit.      Past Medical History:   Diagnosis Date    Acute left-sided thoracic back pain 11/1/2018    Acute UTI 11/1/2018    Allergy     Granular corneal dystrophy     bilateral         Objective:     Maternal Physical Lactation Exam  General: No acute distress  Breasts: Symmetrical , Soft, Plugged Duct - no evidence, and Mastitis  - no S/S  Nipples: intact  Psychiatric: Normal mood and affect. Her behavior is normal. Judgment and thought content normal.  Mental Health: Did NOT exhibit sadness, crying, feeling overwhelmed, agitation or hypervigilance.    Infant Oral Lactation Exam:   Oral: Tongue lift 100%, Tongue extension over gum line, Lingual frenum appearance Coryllos type 3, Maxillary labial frenum appearance Kotlow class 2 vascular, easy lift. No tongue or lip restrictions interfering with breastfeeding  Dysfunctional suck .  Oral digital exam reveals no cupping, mostly chewing,  loses  when lip pulled down. Happy to suck on the finger.     Assessment/Plan & Lactation Counseling:     Infant Exam on Infant Chart    Infant Weight History:   11/05/2023: 6# 15oz  12/07/2023: 7# 3.1oz  12/15/2023: 8#1.0oz  14oz/8 days    Infant Intake at Breast:  Right 0.8oz  Left 0.6 + 0.4oz   Total: 1.8oz (54ml today, 42ml a week ago.)  Milk Transfer at this feeding:   increasingly effective but not 100% yet  Milk transfer impacted by    Tightness of neck and back   Neck preference    Pumped: Not indicated   Initiation of Feeding: Infant initiates  Position of Feeding:    Right: cross cradle  Left: cross cradle  Attachment Achieved: rapidly  Nipple shield: N/A      Suck Pattern at the Breast: Chewing  Suck Pattern on the Bottle: Not Indicated   Behavior Following Observed Feeding: content  Nipple Pain: None     Latch: Mom latches independently  Suckling/Feeding: attaches, baby roots, elicits RADHA, frequent pauses, and rhythmic  Sucking strength: Moderate Strong  Sucking Rhythm Coordinated   Compression: WNL    Once latched, baby did not fall fell into a mature and fully integrated SSB pattern. Rather more chewing and slightly using his cheeks   Swallowing No difficulty noted  If functional feeding, it is quiet, rhythmic, coordinated, organized, effeicent safe, satisfying and pleasurable for both parent and baby?  No  Milk Supply Available: Building    Low Milk Supply:   Likely due to: delay in lactogenesis II and ineffective or infrequent breast stimulation or milk removal    MATERNAL PERSONALIZED BREASTFEEDING PLAN  Discussed concerns and symptoms as listed above in assessment and guidance summarized below. Shared decision making was used between myself and the family for this encounter, home care, and follow up. Topics reviewed included:   The nature of infants oral head/neck structure and function and its impact on latch and transfer of milk.   Discussed The Oral and Written Language Scales, Second  Edition (OWLS II) is a norm-referenced comprehensive assessment of language with a mean score of 100 and a standard deviation of 15, scores between 85 and 115 are considered within normal limits. OWLS II includes four scales: listening comprehension (LC), oral expression (OE), reading comprehension (RC), and written comprehension (WC) to identify areas of strengths and weaknesses in oral and written expression of clients aged 3-21.  Unilateral neck rotation is a normal  finding that should correct itself over the next few weeks.    However when there is a neck preference it can make latching more difficult.    Infant head can be supported in the car seat.    Bottle feeding baby's can be encouraged to look to the opposite side of the preference  Infant can be can talked to from the side encouraging baby to turn to.   Milk Supply is dependent on glandular tissue development, hormonal influences, how many times the baby removes milk and how well the breasts are emptied in a 24 hour period. This is a biological reality that we can NOT work around. If, for any reason, your baby is not latching, or you are not able to nurse, then it is important for you to remove the milk instead by pumping or hand expression.  There's no magic trick, tea, food, drink, cookie or supplement that will increase your milk supply. One  must  effectively remove milk to continue to make and maximize milk. In the early days and weeks that can be 8+ times in 24 hours. For older babies, on average 6-7 + times in 24 hours.    Hydration: Staying hydrated is important however lack of hydration is usually not a cause of significantly low milk production.  Everyone needs a different amount of water, depending on their activity and diet. A high salt and/or high-protein diet, high physical activity, or very warm weather/sweating will require more fluids. A person eating a diet high in veggies and fruit, with a lack of physical activity will require  fewer fluids. There is no magic number for the # of ounces of water each day.The best way to know that you are well hydrated is by looking at your urine.  Urine should look clear to light pale yellow, and you should need to pee at least every 3 to 4 hours unless you have a large bladder capacity.  Herbs and Medications: A galactagogue is an herb or medication taken by a breastfeeding mother to increase her milk suply. We know that for centuries mothers around the world have sought out remedies to increase their milk supply. However, there is limited research on the safety and effectiveness of herbal galactagogues, which makes it hard for us to endorse them. It is not known if any of these herbs are truly effective, and it is difficult to predict how a specific herbal galactagogue will affect an individual, requiring “trial and error” in many situations. When effective, results are generally seen within 24-72 hours of starting an herbal galactagogue. Many of these herbs are used to decrease high blood sugar. If you are diabetic or have problems with low blood sugar, or thyroid disease you may not be a candidate for herbs. Not all women can increase their low milk supply with a galactagogue due to the many underlying causes of low milk production.  When taking a galactagogue, remember that frequent milk removal is still the most effective way to increase supply.   Feeding:   Infant feeding well given current interval growth, guidelines to follow:  Feed your baby every 1.5-3 hours, more often if baby acts hungry.   Awaken baby for feeding if going over 3 hours in the day.    Daily goal: 8-12 feedings per 24 hours.    Given infants weight you may allow baby to go longer at night but that generally means shorter durations in the day.  Strategies to facilitate feedings   Suck training Have baby suck on your clean finger (nail down) pad of finger in the palate  Introduce slowly, avoid gagging.  Press up slightly in the  "palate and pull finger toward the front of the mouth, not all the way out When baby sucks, use \"tug of war\" to provide resistance.    Sometimes our work is to disrupt old patterns so that new and more functional patterns can be established.  STRATEGIES to reduce tightness and facilitate tongue use    At the breast and/or  bottle;               Tug of war and then pull the lip down to encourage the tongue to cup and move forward              Peace sign at the breast with traction or single traction  Lip Massage [email or web address]  Biting  Tight lips  Supplement:   Supplement with expressed milk  5oz per 24 hours  2 bottles or an ounce after 6 feedings  Supplement with formula   Proper powder formula preparation: https://www.cdc.gov/nutrition/downloads/prepare-store-powered-sgleuw-mnveykr-898.pdf.   For babies under 2 months: https://www.cdc.gov/cronobacter/infection-and-infants.html  Pumping Guidelines:  Both breasts 2 times in 24 hours, feeding bottle instead of breastfeeding  Type of Pump:  Medela  Power Pumping is only rarely indicated as the response is not significant or zero over 24 hours  How long will vary woman to woman, typically 8-15 minutes, or 1-2 minutes after flow slows  Flange Fit: Freely moving nipple in the tunnel with some movement of the areola.  Increasing supply besides Galactogogue's and Pumping:  Warmth  Relaxation   Physical, auditory narratives  Childbirth relaxation Techniques  Acupuncture and acupressure  Gustavo Hawley Symmetry Acupuncture  Answers to FAQs: [email or web address]  Alcohol & Breastfeeding: What's your time-to-zero?  Cough & Cold Medications while Breastfeeding  Vitamin D Supplementation and Breastfeeding  Antidepressant Use While Breastfeeding: What should I know?  Breastfeeding, Caffeine, and Energy Drinks  Recommended resources:  Physicians guide to breastfeeding, must up to date and accurate information available on " breastfeeding. https://physicianguidetobreastfeeding.org/  Connect with other mothers: Mom attending Biggest Baby group, this is an option  Facebook:   [place] Breastfeeds: https://www.facebook.com/[place].breastfeeds/  Well-Nourished Babies (Private group for questions and support): https://www.facebook.com/groups/638946999896025/  Breastfeeding Spencer including opportunity to weight your baby and do before and after weights   Spoiler alert!  Parenting can be really hard. There is so much to learn when it comes to caring for small humans.  It can feel lonely and unsettling.  Our group, is a parenting and feeding support group that's all about feeding/growing casimiro.   Babies welcome.   Questions expected.   We will help you find your village!    Tuesdays 10am - 11am. Women's Health at [address] [place] [address], [address] [place] [address], 3rd floor conference room  Check your baby's weight, do a feeding and see how your baby is growing, visit with other mothers, plan on a walk or coffee date after group.  Please download Growth: Baby and Child for [employer] or Child Growth Tracker for [employer] if you would like to  document and follow your baby's growth curve.  Due to space limitations - limit strollers please (New c/section moms please use your stroller).  We would love to have dads stay, but moms won't breastfeed if there are men in the room, sorry.  The room is generally scheduled for another event following group.  Please take all diapers with you   ONLINE SUPPORT GROUPS  [employer] (PSI) support groups are conducted using a vnnj-at-bmii support model and are not intended for those experiencing a mental health crisis. Groups are 90 minutes (1.5 hours) in length. The first ~30 minutes is providing information, education, and establishing group guidelines. The next ~60 minutes is “talk time,” in which group members share and talk with each other. Group members must be present for the group guidelines before joining in  the discussion or “talk time.”     In Conclusion:   Managing breastfeeding and milk supply is very dynamic,can be a complex and intimate journey, and is not one size fits all. When obstacles present themselves, it takes confidence, persistence and support. The rights of the child include optimal nutrition and mothers need help to make informed decisions. You  and your baby have been screened for biological, psychological, and social risk factors that might interfere with achieving your goals.  Support is critical. We want the family thriving not just tolerating life. You are now the focus of our Breastfeeding Medicine team; we are here to support your decisions and vision.     Follow up requires monitoring in this time sensitive window of opportunity to maximize milk supply and facilitate the learning of  breastfeeding.    Please call [phone] our voicemail line or the front office at [phone] to scheduled your next appointment.  Family is encouraged to e-mail or mychart us to update how the plan is working.    A total of 75 minutes, not including infant assessment time,  was spent preparing to see the patient, obtaining and reviewing separately obtained history, performing a medically appropriate examination and evaluation, counseling and educating the family, documenting clinical information in the electronic health record, independently interpreting weighted feeds and infant growth results, communicating these results to the family and care coordination as detailed in the above note.       MAVERICK Morrison.

## 2023-12-26 ENCOUNTER — NON-PROVIDER VISIT (OUTPATIENT)
Dept: OBGYN | Facility: CLINIC | Age: 28
End: 2023-12-26
Payer: COMMERCIAL

## 2023-12-26 NOTE — PROGRESS NOTES
"Summary: Breastfeeding every 2-3 hours throughout the day, up to 4 hours at night. Offering both breasts, up to 10-15 minutes on each side. Pumping 1x daily, in place of breastfeeding in the late afternoon. Using that pumped milk as a top off if needed or saving if he seems content after all feedings. Offering 2 bottles of formula in place of breastfeeding, around 12pm and 3pm, sometimes offering the breast first for stimulation.   Today: Latched George to both breasts, cross cradle, transferred a total of 52mls with some fussiness. Settled once dressed.   Plan: Feed George frequently throughout the day, every 1.5-3 hours, no need to wake him for nighttime feedings. Offer both breasts, up to 10-15 minutes. Pump after first and last feeding of the day and in place of breastfeeding as desired. If not breastfeeding offer 3oz. Top off after breastfeeding as needed.     Follow up:   Lactation appointment:  2024  Baby 's Provider appointment: 2 Month Well Child Check    Referrals: None    Maternal Diagnosis/Problem:  Lactation Suppression  and Lactation Problem  Infant Diagnosis/Problem:   difficulties feeding at the breast  and Slow weight gain of  improving    Subjective:     Alina Singh is a 28 y.o. female here for lactation care. She is here today with baby.    Concerns:   Maternal: Latch on difficulties , Feeling that there is not enough milk , Weight check, Infant feeding evaluation, and Breastfeeding questions   Infant: Baby always seems hungry, Slow weight gain , and Infant \"tongue tied\" questions    HPI:   Pertinent  history: c/section 39.0 weeks     Mother does not have a history of advanced maternal age, GDM, hypertension prior to pregnancy, GHTN, insulin resistance, multiple gestation, PCOS, thyroid disease, auto immune disease , placenta encapsulation, and breast surgery     Breast changes in pregnancy: Yes  History of breast surgeries: No     FEEDING HISTORY:  "   Previous Breastfeeding History: First baby.   Hospital Course: Delivered at [facility]. Reports pain and damaged nipples while in the hospital.   Prior to consultation on 12/7/2023: Alina reports baby had not lost a concerning amount of weight at his first appointment but did not gain weight by the 2 week appointment. At that time the pediatrician gave her a nipple shield and recommended pumping after breastfeeding and feeding all pumped milk back to the baby. He gained 4oz in 1 week. Now, breastfeeding every 2 hours during the day, every 3-4 hours at night. Waking baby for most feedings. Offering both breasts, up to 15 minutes on each side. Offering 2oz of formula after 3 feedings daily. No longer pumping due to feeling overwhelmed and yielding small volumes of milk. No longer using the nipple shield.     Prior to consultation on 12/15/2023  Jesus Vazquez has struggled with weight gain since the 2 week appointment, mom went through a nipple shield, pumping and formula, trying to breastfeed along the way. She is currently breastfeeding every two hours, topping off with an ounce of formula after 6 feedings, no top off at night. Xin has started gaining well since the last lactation appointment. Mom prefers not to pump and had only been getting about 10ml after a feeding. However yesterday she pumped 3x and got a total of an ounce. Mom would like to breastfeed and not use formula. She returns to work in 6 weeks and is concerned about supply for returning to work.   Currently 12/26/2023: Breastfeeding every 2-3 hours throughout the day, up to 4 hours at night. Offering both breasts, up to 10-15 minutes on each side. Pumping 1x daily, in place of breastfeeding in the late afternoon. Using that pumped milk as a top off if needed or saving if he seems content after all feedings. Offering 2 bottles of formula in place of breastfeeding, around 12pm and 3pm, sometimes offering the breast first for stimulation.      Both breasts: Yes     Supplement: Formula  Quantity: 5oz / 24 hours  How given/devices: Bottle  Bottle/nipple type: Dr. Brown, Level 1     Nipple Shield Use: From ped office, no longer using      Breast Pumping:  Frequency: 1x  Yield: 2-3oz   Type of Pump: Medela  Flange size/type: 24mm  Wearable: No  NO pain with pumping    Maternal ROS:  Constitutional: No fever, chills. Feeling well  Breasts: No soreness of breasts and No soreness of nipples  Psychiatric: Managing ok  Mental Health: No mention of feeling irritable, agitated, angry, overwhelmed, apathetic, appetite changes, exhausted nor having sleep changes outside infant feeds/demands.  Current Outpatient Medications on File Prior to Visit   Medication Sig Dispense Refill    multivitamin Tab Take 1 Tablet by mouth every day. Eye multivitamin       No current facility-administered medications on file prior to visit.      Past Medical History:   Diagnosis Date    Acute left-sided thoracic back pain 11/1/2018    Acute UTI 11/1/2018    Allergy     Granular corneal dystrophy     bilateral         Objective:     Maternal Physical Lactation Exam  General: No acute distress  Breasts: Symmetrical , Soft, Plugged Duct - no evidence, and Mastitis  - no S/S  Nipples: intact  Psychiatric: Normal mood and affect. Her behavior is normal. Judgment and thought content normal.  Mental Health: Did NOT exhibit sadness, crying, feeling overwhelmed, agitation or hypervigilance.    Infant Oral Lactation Exam:   Oral: Tongue lift 100%, Tongue extension over gum line, Lingual frenum appearance Coryllos type 3, Maxillary labial frenum appearance Kotlow class 2 vascular, easy lift. No tongue or lip restrictions interfering with breastfeeding  Dysfunctional suck .  Oral digital exam reveals no cupping, mostly chewing, loses  when lip pulled down. Happy to suck on the finger.     Assessment/Plan & Lactation Counseling:     Infant Exam on Infant Chart    Infant Weight History:    11/05/2023: 6# 15oz  12/07/2023: 7# 3.1oz  12/26/2023: 8# 1.0oz (14oz/8 days)  12/19/2023: 8# 5.4oz (4.4oz/7 days)  12/26/2023: 9# 1.7oz (12.3oz/7 days)    Infant Intake at Breast:  L   22mls   R  26mls   L  4mls      Total: 52mls  Milk Transfer at this feeding: Mostly effective     Pumped: Not indicated   Initiation of Feeding: Infant initiates  Position of Feeding:    Right: cross cradle  Left: cross cradle  Attachment Achieved: rapidly  Nipple shield: N/A      Suck Pattern at the Breast: Chewing  Suck Pattern on the Bottle: Not Indicated   Behavior Following Observed Feeding: content  Nipple Pain: None     Latch: Mom latches independently  Suckling/Feeding: attaches, baby roots, elicits RADHA, frequent pauses, and rhythmic  Sucking strength: Moderate Strong  Sucking Rhythm Coordinated   Compression: WNL    Once latched, baby did not fall fell into a mature and fully integrated SSB pattern. Rather more chewing and slightly using his cheeks   Swallowing No difficulty noted  If functional feeding, it is quiet, rhythmic, coordinated, organized, effeicent safe, satisfying and pleasurable for both parent and baby? Yes, mostly   Milk Supply Available: Building      MATERNAL PERSONALIZED BREASTFEEDING PLAN  Discussed concerns and symptoms as listed above in assessment and guidance summarized below. Shared decision making was used between myself and the family for this encounter, home care, and follow up. Topics reviewed included:   Feeding:   Infant feeding well given current interval growth, guidelines to follow:  Feed your baby every 1.5-3 hours, more often if baby acts hungry.   Awaken baby for feeding if going over 3 hours in the day.    Daily goal: 8-12 feedings per 24 hours.    Given infants weight you may allow baby to go longer at night but that generally means shorter durations in the day.  Supplement:   Supplement with expressed milk or formula as needed  Offer after breastfeeding as needed   Offer replacement  bottles as desired, 3oz each   Pumping Guidelines:  Both breasts 2-3 times in 24 hours  After first and last feeding of the day  In place of breastfeeding as desired  Type of Pump:  Medela  Power Pumping is only rarely indicated as the response is not significant or zero over 24 hours  How long will vary woman to woman, typically 8-15 minutes, or 1-2 minutes after flow slows  Flange Fit: Freely moving nipple in the tunnel with some movement of the areola.  Increasing supply besides Galactogogue's and Pumping:  Warmth  Relaxation   Physical, auditory narratives  Childbirth relaxation Techniques  Acupuncture and acupressure  Gustavo Hawley Symmetry Acupuncture  Answers to FAQs: [email or web address]  Alcohol & Breastfeeding: What's your time-to-zero?  Cough & Cold Medications while Breastfeeding  Vitamin D Supplementation and Breastfeeding  Antidepressant Use While Breastfeeding: What should I know?  Breastfeeding, Caffeine, and Energy Drinks  Recommended resources:  Physicians guide to breastfeeding, must up to date and accurate information available on breastfeeding. [email or web address].[email or web address]  Connect with other mothers: Mom attending Biggest Baby group, this is an option  Facebook:   [place] Breastfeeds: [email or web address]  Well-Nourished Babies (Private group for questions and support): https://www.facebook.com/groups/558692102339217/  Breastfeeding [place] including opportunity to weight your baby and do before and after weights   Spoiler alert!  Parenting can be really hard. There is so much to learn when it comes to caring for small humans.  It can feel lonely and unsettling.  Our group, is a parenting and feeding support group that's all about feeding/growing casimiro.   Babies welcome.   Questions expected.   We will help you find your village!    Tuesdays 10am - 11am. [facility] at [address], [address], 3rd floor conference  room  Check your baby's weight, do a feeding and see how your baby is growing, visit with other mothers, plan on a walk or coffee date after group.  Please download Growth: Baby and Child for Apple or Child Growth Tracker for Androids if you would like to  document and follow your baby's growth curve.  Due to space limitations - limit strollers please (New c/section moms please use your stroller).  We would love to have dads stay, but moms won't breastfeed if there are men in the room, sorry.  The room is generally scheduled for another event following group.  Please take all diapers with you   ONLINE SUPPORT GROUPS  Postpartum Support International (PSI) support groups are conducted using a [id number]-nm-[id number] support model and are not intended for those experiencing a mental health crisis. Groups are 90 minutes (1.5 hours) in length. The first ~30 minutes is providing information, education, and establishing group guidelines. The next ~60 minutes is “talk time,” in which group members share and talk with each other. Group members must be present for the group guidelines before joining in the discussion or “talk time.”       Follow up requires monitoring in this time sensitive window of opportunity to maximize milk supply and facilitate the learning of  breastfeeding.    Please call [phone] our voicemail line or the front office at [phone] to scheduled your next appointment.  Family is encouraged to e-mail or mychart us to update how the plan is working.    A total of 60 minutes, including infant assessment time,  was spent preparing to see the patient, obtaining and reviewing separately obtained history, performing a medically appropriate examination and evaluation, counseling and educating the family, documenting clinical information in the electronic health record, independently interpreting weighted feeds and infant growth results, communicating these results to the family and care coordination as detailed in the  above note.       Ramonita Mancilla

## 2024-01-05 ENCOUNTER — OFFICE VISIT (OUTPATIENT)
Dept: OBGYN | Facility: CLINIC | Age: 29
End: 2024-01-05
Payer: COMMERCIAL

## 2024-01-05 DIAGNOSIS — O92.5 LACTATION SUPPRESSION: ICD-10-CM

## 2024-01-05 DIAGNOSIS — O92.70 LACTATION PROBLEM: ICD-10-CM

## 2024-01-05 PROCEDURE — 99215 OFFICE O/P EST HI 40 MIN: CPT | Performed by: NURSE PRACTITIONER

## 2024-01-05 NOTE — PROGRESS NOTES
Summary: Breastfeeding every 2-3 hours throughout the day, up to 3.5 hours at night. Offering both breasts for most feeding. Pumping in place of the first feeding of the day, yielding up to 5oz. Offering 3oz bottle in place of breastfeeding. Offering 2, 1oz bottles in the afternoon after the breast.     Today: Fed and pumping 1.5 hours prior to appointment. Latched George to both breasts, cross cradle, transferred a total of 46mls with some fussiness. Settled quickly once dressed.   Plan: Feed George frequently throughout the day, every 1.5-3 hours, no need to wake him for nighttime feedings. Offer both breasts, up to 10-15 minutes. Pump after first and last feeding of the day and in place of breastfeeding as desired. If not breastfeeding offer 3-4oz. Top off after breastfeeding as needed. Recommended to move the top off bottles to the 2 feedings before bed to see if it helps George sleep a longer stretch at night.      Follow up:   Lactation appointment:  2-3 Weeks  Baby 's Provider appointment: 2 Month Well Child Check    Referrals: None    Maternal Diagnosis/Problem:  Lactation Suppression  and Lactation Problem  Infant Diagnosis/Problem:   difficulties feeding at the breast  and Slow weight gain of  improving    Subjective:     Alina Snigh is a 28 y.o. female here for lactation care. She is here today with baby.    Concerns: Weight Check and Feeding Evaluation     HPI:   Pertinent  history: c/section 39.0 weeks     Mother does not have a history of advanced maternal age, GDM, hypertension prior to pregnancy, GHTN, insulin resistance, multiple gestation, PCOS, thyroid disease, auto immune disease , placenta encapsulation, and breast surgery     Breast changes in pregnancy: Yes  History of breast surgeries: No     FEEDING HISTORY:    Previous Breastfeeding History: First baby.   Hospital Course: Delivered at [facility]. Reports pain and damaged nipples while in the hospital.    Prior to consultation on 12/7/2023: Alina serna baby had not lost a concerning amount of weight at his first appointment but did not gain weight by the 2 week appointment. At that time the pediatrician gave her a nipple shield and recommended pumping after breastfeeding and feeding all pumped milk back to the baby. He gained 4oz in 1 week. Now, breastfeeding every 2 hours during the day, every 3-4 hours at night. Waking baby for most feedings. Offering both breasts, up to 15 minutes on each side. Offering 2oz of formula after 3 feedings daily. No longer pumping due to feeling overwhelmed and yielding small volumes of milk. No longer using the nipple shield.     Prior to consultation on 12/15/2023  Jesus Vazquez has struggled with weight gain since the 2 week appointment, mom went through a nipple shield, pumping and formula, trying to breastfeed along the way. She is currently breastfeeding every two hours, topping off with an ounce of formula after 6 feedings, no top off at night. Xin has started gaining well since the last lactation appointment. Mom prefers not to pump and had only been getting about 10ml after a feeding. However yesterday she pumped 3x and got a total of an ounce. Mom would like to breastfeed and not use formula. She returns to work in 6 weeks and is concerned about supply for returning to work.   Prior to consultation on 12/26/2023: Breastfeeding every 2-3 hours throughout the day, up to 4 hours at night. Offering both breasts, up to 10-15 minutes on each side. Pumping 1x daily, in place of breastfeeding in the late afternoon. Using that pumped milk as a top off if needed or saving if he seems content after all feedings. Offering 2 bottles of formula in place of breastfeeding, around 12pm and 3pm, sometimes offering the breast first for stimulation.   Currently 1/5/2024: Breastfeeding every 2-3 hours throughout the day, up to 3.5 hours at night. Offering both breasts for most feeding.  Pumping in place of the first feeding of the day, yielding up to 5oz. Offering 3oz bottle in place of breastfeeding. Offering 2, 1oz bottles in the afternoon after the breast.      Both breasts: Yes     Supplement: Formula  Quantity: 5oz / 24 hours  How given/devices: Bottle  Bottle/nipple type: Dr. Brown, Level 1     Nipple Shield Use: From ped office, no longer using      Breast Pumping:  Frequency: 1-2x  Yield: 5oz   Type of Pump: Medela  Flange size/type: 24mm  Wearable: No  NO pain with pumping    Maternal ROS:  Constitutional: No fever, chills. Feeling well  Breasts: No soreness of breasts and No soreness of nipples  Psychiatric: Managing ok  Mental Health: No mention of feeling irritable, agitated, angry, overwhelmed, apathetic, appetite changes, exhausted nor having sleep changes outside infant feeds/demands.  Current Outpatient Medications on File Prior to Visit   Medication Sig Dispense Refill    multivitamin Tab Take 1 Tablet by mouth every day. Eye multivitamin       No current facility-administered medications on file prior to visit.      Past Medical History:   Diagnosis Date    Acute left-sided thoracic back pain 11/1/2018    Acute UTI 11/1/2018    Allergy     Granular corneal dystrophy     bilateral         Objective:     Maternal Physical Lactation Exam  General: No acute distress  Breasts: Symmetrical , Soft, Plugged Duct - no evidence, and Mastitis  - no S/S  Nipples: intact  Psychiatric: Normal mood and affect. Her behavior is normal. Judgment and thought content normal.  Mental Health: Did NOT exhibit sadness, crying, feeling overwhelmed, agitation or hypervigilance.     Assessment/Plan & Lactation Counseling:     Infant Exam on Infant Chart    Infant Weight History:   11/05/2023: 6# 15oz  12/07/2023: 7# 3.1oz  12/26/2023: 8# 1.0oz (14oz/8 days)  12/19/2023: 8# 5.4oz (4.4oz/7 days)  12/26/2023: 9# 1.7oz (12.3oz/7 days)  01/02/2024: 9# 5oz  01/05/2024: 9# 9.3oz    Infant Intake at Breast:  R    22mls   L  24mls      Total: 46mls  Milk Transfer at this feeding: Mostly effective     Pumped: Not indicated   Initiation of Feeding: Infant initiates  Position of Feeding:    Right: cross cradle  Left: cross cradle  Attachment Achieved: rapidly  Nipple shield: N/A      Suck Pattern at the Breast: Suck Burst Normal Rest  Suck Pattern on the Bottle: Not Indicated   Behavior Following Observed Feeding: content  Nipple Pain: None     Latch: Mom latches independently  Suckling/Feeding: attaches, baby roots, elicits RADHA, frequent pauses, and rhythmic  Sucking strength: Moderate Strong  Sucking Rhythm Coordinated   Compression: WNL    Once latched, baby did not fall fell into a mature and fully integrated SSB pattern. Rather more chewing and slightly using his cheeks   Swallowing No difficulty noted  If functional feeding, it is quiet, rhythmic, coordinated, organized, effeicent safe, satisfying and pleasurable for both parent and baby? Yes, mostly   Milk Supply Available: Continues Building      MATERNAL PERSONALIZED BREASTFEEDING PLAN  Discussed concerns and symptoms as listed above in assessment and guidance summarized below. Shared decision making was used between myself and the family for this encounter, home care, and follow up. Topics reviewed included:   Feeding:   Infant feeding well given current interval growth, guidelines to follow:  Feed your baby every 1.5-3 hours, more often if baby acts hungry.   No need to wake George for nighttime feedings.     Daily goal: 8-10 feedings per 24 hours.   Supplement:   Supplement with expressed milk or formula as needed  Offer after breastfeeding as needed, 1oz   Offer replacement bottles as desired, 3-4oz    Pumping Guidelines:  Both breasts 2-3 times in 24 hours  After first and last feeding of the day  In place of breastfeeding as desired  Type of Pump:  Medela  Power Pumping is only rarely indicated as the response is not significant or zero over 24 hours  How long  will vary woman to woman, typically 8-15 minutes, or 1-2 minutes after flow slows  Flange Fit: Freely moving nipple in the tunnel with some movement of the areola.  Connect with other mothers: Mom attending Biggest Baby group, this is an option  Facebook:   [place] Breastfeeds: [email or web address]  Well-Nourished Babies (Private group for questions and support): https://www.facebook.com/groups/989048002474974/  Breastfeeding [place] including opportunity to weight your baby and do before and after weights   Spoiler alert!  Parenting can be really hard. There is so much to learn when it comes to caring for small humans.  It can feel lonely and unsettling.  Our group, is a parenting and feeding support group that's all about feeding/growing casimiro.   Babies welcome.   Questions expected.   We will help you find your village!    Tuesdays 10am - 11am. Women's Health at [address] [place] [address], [address] [place] [address], 3rd floor conference room  Check your baby's weight, do a feeding and see how your baby is growing, visit with other mothers, plan on a walk or coffee date after group.  Please download Growth: Baby and Child for Apple or Child Growth Tracker for Androids if you would like to  document and follow your baby's growth curve.  Due to space limitations - limit strollers please (New c/section moms please use your stroller).  We would love to have dads stay, but moms won't breastfeed if there are men in the room, sorry.  The room is generally scheduled for another event following group.  Please take all diapers with you   ONLINE SUPPORT GROUPS  Postpartum Support International (PSI) support groups are conducted using a mzrf-hz-avwh support model and are not intended for those experiencing a mental health crisis. Groups are 90 minutes (1.5 hours) in length. The first ~30 minutes is providing information, education, and establishing group guidelines. The next ~60 minutes is “talk time,” in which group members  share and talk with each other. Group members must be present for the group guidelines before joining in the discussion or “talk time.”       Follow up   Please call [phone] our voicemail line or the front office at [phone] to scheduled your next appointment.  Family is encouraged to e-mail or mychart us to update how the plan is working.    A total of 50 minutes, including infant assessment time,  was spent preparing to see the patient, obtaining and reviewing separately obtained history, performing a medically appropriate examination and evaluation, counseling and educating the family, documenting clinical information in the electronic health record, independently interpreting weighted feeds and infant growth results, communicating these results to the family and care coordination as detailed in the above note.       Ramonita Mancilla

## 2024-01-25 ENCOUNTER — OFFICE VISIT (OUTPATIENT)
Dept: OBGYN | Facility: CLINIC | Age: 29
End: 2024-01-25
Payer: COMMERCIAL

## 2024-01-25 DIAGNOSIS — O92.5 LACTATION SUPPRESSION: ICD-10-CM

## 2024-01-25 DIAGNOSIS — O92.70 LACTATION PROBLEM: ICD-10-CM

## 2024-01-25 PROCEDURE — 99215 OFFICE O/P EST HI 40 MIN: CPT | Performed by: NURSE PRACTITIONER

## 2024-01-25 NOTE — PROGRESS NOTES
Summary: Breastfeeding every 2 hours throughout the day, slightly longer stretches at night. Offering both breasts for all feedings. Pumped and bottle fed for 24 hours. Yielded 2-3oz throughout the day, up to 5 oz in the middle of the night. Fed 4oz every 3 hours. Returning to work in 3 days, 8 hours and 5 days per week.   Today: Latched George to both breasts, cross cradle, with ease. George transferred 22mls from the left breast and 32mls from the right breast. Offered the left breast again, 0mls transferred. Baby content to be dressed and placed in car seat.   Plan: Feed George frequently throughout the day, every 1.5-3 hours, no need to wake him for nighttime feedings. Offer both breasts, up to 10-15 minutes. When working, pump on the way to work and 3x while at work. Grandparents can offer 3, 4oz bottles. Discussed the option to go up to 14oz while at work.     Follow up:   Lactation appointment:  As Needed  Baby 's Provider appointment: 4 Month Well Child Check    Referrals: None    Maternal Diagnosis/Problem:  Lactation Suppression  and Lactation Problem  Infant Diagnosis/Problem:   difficulties feeding at the breast      Subjective:     Alina Singh is a 28 y.o. female here for lactation care. She is here today with baby.    Concerns: Weight Check and Feeding Evaluation     HPI:   Pertinent  history: c/section 39.0 weeks     Mother does not have a history of advanced maternal age, GDM, hypertension prior to pregnancy, GHTN, insulin resistance, multiple gestation, PCOS, thyroid disease, auto immune disease , placenta encapsulation, and breast surgery     Breast changes in pregnancy: Yes  History of breast surgeries: No     FEEDING HISTORY:    Previous Breastfeeding History: First baby.   Hospital Course: Delivered at [facility]. Reports pain and damaged nipples while in the hospital.   Prior to consultation on 2023: Alina reports baby had not lost a concerning amount of  weight at his first appointment but did not gain weight by the 2 week appointment. At that time the pediatrician gave her a nipple shield and recommended pumping after breastfeeding and feeding all pumped milk back to the baby. He gained 4oz in 1 week. Now, breastfeeding every 2 hours during the day, every 3-4 hours at night. Waking baby for most feedings. Offering both breasts, up to 15 minutes on each side. Offering 2oz of formula after 3 feedings daily. No longer pumping due to feeling overwhelmed and yielding small volumes of milk. No longer using the nipple shield.     Prior to consultation on 12/15/2023  Jesus Vazquez has struggled with weight gain since the 2 week appointment, mom went through a nipple shield, pumping and formula, trying to breastfeed along the way. She is currently breastfeeding every two hours, topping off with an ounce of formula after 6 feedings, no top off at night. Xin has started gaining well since the last lactation appointment. Mom prefers not to pump and had only been getting about 10ml after a feeding. However yesterday she pumped 3x and got a total of an ounce. Mom would like to breastfeed and not use formula. She returns to work in 6 weeks and is concerned about supply for returning to work.   Prior to consultation on 12/26/2023: Breastfeeding every 2-3 hours throughout the day, up to 4 hours at night. Offering both breasts, up to 10-15 minutes on each side. Pumping 1x daily, in place of breastfeeding in the late afternoon. Using that pumped milk as a top off if needed or saving if he seems content after all feedings. Offering 2 bottles of formula in place of breastfeeding, around 12pm and 3pm, sometimes offering the breast first for stimulation.   Prior to consultation on 1/5/2024: Breastfeeding every 2-3 hours throughout the day, up to 3.5 hours at night. Offering both breasts for most feeding. Pumping in place of the first feeding of the day, yielding up to 5oz. Offering 3oz  bottle in place of breastfeeding. Offering 2, 1oz bottles in the afternoon after the breast.    Currently 1/25/2024: Breastfeeding every 2 hours throughout the day, slightly longer stretches at night. Offering both breasts for all feedings. Pumped and bottle fed for 24 hours. Yielded 2-3oz throughout the day, up to 5 oz in the middle of the night. Fed 4oz every 3 hours. Returning to work in 3 days, 8 hours and 5 days per week.     Both breasts: Yes     Supplement: Formula  Quantity: 4-6oz / 24 hours  How given/devices: Bottle  Bottle/nipple type: Dr. Brown, Level 1     Nipple Shield Use: N/A      Breast Pumping:  Frequency: 1-2x  Yield: 2-5oz   Type of Pump: Medela  Flange size/type: 24mm  Wearable: No  NO pain with pumping    Maternal ROS:  Constitutional: No fever, chills. Feeling well  Breasts: No soreness of breasts and No soreness of nipples  Psychiatric: Managing ok  Mental Health: No mention of feeling irritable, agitated, angry, overwhelmed, apathetic, appetite changes, exhausted nor having sleep changes outside infant feeds/demands.  Current Outpatient Medications on File Prior to Visit   Medication Sig Dispense Refill    multivitamin Tab Take 1 Tablet by mouth every day. Eye multivitamin       No current facility-administered medications on file prior to visit.      Past Medical History:   Diagnosis Date    Acute left-sided thoracic back pain 11/1/2018    Acute UTI 11/1/2018    Allergy     Granular corneal dystrophy     bilateral         Objective:     Maternal Physical Lactation Exam  General: No acute distress  Breasts: Symmetrical , Soft, Plugged Duct - no evidence, and Mastitis  - no S/S  Nipples: intact  Psychiatric: Normal mood and affect. Her behavior is normal. Judgment and thought content normal.  Mental Health: Did NOT exhibit sadness, crying, feeling overwhelmed, agitation or hypervigilance.     Assessment/Plan & Lactation Counseling:     Infant Exam on Infant Chart    Infant Weight History:    11/05/2023: 6# 15oz  12/07/2023: 7# 3.1oz  12/26/2023: 8# 1.0oz (14oz/8 days)  12/19/2023: 8# 5.4oz (4.4oz/7 days)  12/26/2023: 9# 1.7oz (12.3oz/7 days)  01/02/2024: 9# 5oz  01/05/2024: 9# 9.3oz  01/25/2024: 10# 15.4oz    Infant Intake at Breast:  L   22mls   R   32mls      Total: 54mls  Milk Transfer at this feeding: Effective     Pumped: Not indicated   Initiation of Feeding: Infant initiates  Position of Feeding:    Right: cross cradle  Left: cross cradle  Attachment Achieved: rapidly  Nipple shield: N/A      Suck Pattern at the Breast: Suck Burst Normal Rest  Suck Pattern on the Bottle: Not Indicated   Behavior Following Observed Feeding: content  Nipple Pain: None     Latch: Mom latches independently  Suckling/Feeding: attaches, baby roots, elicits RADHA, frequent pauses, and rhythmic  Sucking strength: Moderate Strong  Sucking Rhythm Coordinated   Compression: WNL    Once latched, baby did not fall fell into a mature and fully integrated SSB pattern. Rather more chewing and slightly using his cheeks   Swallowing No difficulty noted  If functional feeding, it is quiet, rhythmic, coordinated, organized, effeicent safe, satisfying and pleasurable for both parent and baby? Yes, mostly   Milk Supply Available: Continues Building      MATERNAL PERSONALIZED BREASTFEEDING PLAN  Discussed concerns and symptoms as listed above in assessment and guidance summarized below. Shared decision making was used between myself and the family for this encounter, home care, and follow up. Topics reviewed included:   Feeding:   Infant feeding well given current interval growth, guidelines to follow:  Feed your baby every 1.5-3 hours, more often if baby acts hungry.   No need to wake George for nighttime feedings.     Daily goal: 8-10 feedings per 24 hours.   Supplement:   Supplement with expressed milk or formula as needed  Offer after breastfeeding as needed, 1oz   Offer replacement bottles as desired and when working, 4oz     Pumping Guidelines:  Both breasts 3-4 times in 24 hours when working  Type of Pump:  Medela  Power Pumping is only rarely indicated as the response is not significant or zero over 24 hours  How long will vary woman to woman, typically 8-15 minutes, or 1-2 minutes after flow slows  Flange Fit: Freely moving nipple in the tunnel with some movement of the areola.  Connect with other mothers: Mom attending Biggest Baby group, this is an option  Facebook:   [place] Breast[facility]: [email or web address]  Well-Nourished Babies (Private group for questions and support): [email or web address]  Breastfeeding [place] including opportunity to weight your baby and do before and after weights   Spoiler alert!  Parenting can be really hard. There is so much to learn when it comes to caring for small humans.  It can feel lonely and unsettling.  Our group, is a parenting and feeding support group that's all about feeding/growing casimiro.   Babies welcome.   Questions expected.   We will help you find your village!    Tuesdays 10am - 11am. Women's [facility] at [address] [place] [address], [address] [place] [address], 3rd floor conference room  Check your baby's weight, do a feeding and see how your baby is growing, visit with other mothers, plan on a walk or coffee date after group.  Please download Growth: Baby and Child for Apple or Child Growth Tracker for ffk environments if you would like to  document and follow your baby's growth curve.  Due to space limitations - limit strollers please (New c/section moms please use your stroller).  We would love to have dads stay, but moms won't breastfeed if there are men in the room, sorry.  The room is generally scheduled for another event following group.  Please take all diapers with you   ONLINE SUPPORT GROUPS  [employer] ([employer]) support groups are conducted using a quym-gv-htdy support model and are not intended for those experiencing a mental health crisis.  Groups are 90 minutes (1.5 hours) in length. The first ~30 minutes is providing information, education, and establishing group guidelines. The next ~60 minutes is “talk time,” in which group members share and talk with each other. Group members must be present for the group guidelines before joining in the discussion or “talk time.”       Follow up   Please call [phone] our voicemail line or the front office at [phone] to scheduled your next appointment.  Family is encouraged to e-mail or mychart us to update how the plan is working.    A total of 50 minutes, including infant assessment time,  was spent preparing to see the patient, obtaining and reviewing separately obtained history, performing a medically appropriate examination and evaluation, counseling and educating the family, documenting clinical information in the electronic health record, independently interpreting weighted feeds and infant growth results, communicating these results to the family and care coordination as detailed in the above note.       Ramonita Mancilla

## 2025-05-15 ENCOUNTER — OFFICE VISIT (OUTPATIENT)
Dept: URGENT CARE | Facility: PHYSICIAN GROUP | Age: 30
End: 2025-05-15
Payer: COMMERCIAL

## 2025-05-15 VITALS
HEART RATE: 100 BPM | TEMPERATURE: 97.3 F | OXYGEN SATURATION: 96 % | SYSTOLIC BLOOD PRESSURE: 108 MMHG | WEIGHT: 132 LBS | BODY MASS INDEX: 24.29 KG/M2 | HEIGHT: 62 IN | DIASTOLIC BLOOD PRESSURE: 80 MMHG | RESPIRATION RATE: 16 BRPM

## 2025-05-15 DIAGNOSIS — L70.0 ACNE VULGARIS: ICD-10-CM

## 2025-05-15 DIAGNOSIS — L23.9 ALLERGIC DERMATITIS: Primary | ICD-10-CM

## 2025-05-15 DIAGNOSIS — J30.1 SEASONAL ALLERGIC RHINITIS DUE TO POLLEN: ICD-10-CM

## 2025-05-15 PROCEDURE — 3079F DIAST BP 80-89 MM HG: CPT | Performed by: FAMILY MEDICINE

## 2025-05-15 PROCEDURE — 3074F SYST BP LT 130 MM HG: CPT | Performed by: FAMILY MEDICINE

## 2025-05-15 PROCEDURE — 99214 OFFICE O/P EST MOD 30 MIN: CPT | Performed by: FAMILY MEDICINE

## 2025-05-15 RX ORDER — CLINDAMYCIN PHOSPHATE 10 UG/ML
LOTION TOPICAL
COMMUNITY
Start: 2025-05-11

## 2025-05-15 RX ORDER — PREDNISONE 10 MG/1
20 TABLET ORAL DAILY
Qty: 10 TABLET | Refills: 0 | Status: SHIPPED | OUTPATIENT
Start: 2025-05-15 | End: 2025-05-20

## 2025-05-15 RX ORDER — TRETINOIN 0.1 MG/G
GEL TOPICAL
COMMUNITY
Start: 2025-05-11

## 2025-05-15 NOTE — PROGRESS NOTES
"Subjective:   Alina Singh is a 29 y.o. female who presents for Eye Problem (Rash under both eyes x 2 weeks )    Patient presents to took history of this bilateral symmetrical rash under both eyes.  She also has significant acne.  On questioning she is always been extremely sensitive to light but lots of rashes and eczema.  1 week ago she started topical [place] and clindamycin for her acne.  The rash was there before this.  She did use topical Benadryl and the rash and it improved somewhat.  Also recently had a tattoo about 1 week ago that appears to be healing well with no signs of infection      ROS    Medications, Allergies, and current problem list reviewed today in Epic.     Objective:     /80 (BP Location: Left arm, Patient Position: Sitting, BP Cuff Size: Adult)   Pulse 100   Temp 36.3 °C (97.3 °F) (Temporal)   Resp 16   Ht 1.575 m (5' 2\")   Wt 59.9 kg (132 lb)   SpO2 96%     Physical Exam  Constitutional:       Appearance: Normal appearance.   Cardiovascular:      Rate and Rhythm: Normal rate and regular rhythm.   Pulmonary:      Effort: Pulmonary effort is normal.      Breath sounds: Normal breath sounds.   Skin:            Comments: This point patient does have significant acne has been told she can continue her tramadol and clindamycin.  She does have appears to be a 1 cm x 0.5 cm redness rash under both eyes a little bit in the upper eyebrows also has a tattoo on her right neck that is still fresh but no signs of infection at this point does complain of some itchiness and discomfort and occasional swelling at nighttime no drainage broken skin or signs of infection   Neurological:      Mental Status: She is alert.         Assessment/Plan:     Assessment & Plan  Allergic dermatitis  At this point the rash in the eyes appears to be more of an allergic dermatitis type scenario.  Being that she is very sensitive in the past of dermatitis is I think with having us going on the flareup " is what is causing some discomfort recommended she does prednisone 20 once a day for 5 days.  Warned about side effects.  Obviously refusing a cream on her lower and upper eyelids       Seasonal allergic rhinitis due to pollen  Does seem to have a significant seasonal component with consistent histamine production.  Recommend she does Claritin regularly for at least 2 weeks to see if that can improve her symptoms       Acne vulgaris  May continue with topical creams close tries to avoid that area.  Of note the tattoo looks fine but we will keep an eye on it.  See the infectious.  Is most like in the first week week and a half           Differential diagnosis, natural history, supportive care, and indications for immediate follow-up discussed.    Advised the patient to follow-up with the primary care physician for recheck, reevaluation, and consideration of further management.    Please note that this dictation was created using voice recognition software. I have made a reasonable attempt to correct obvious errors, but I expect that there are errors of grammar and possibly content that I did not discover before finalizing the note.    This note was electronically signed by Kamlesh DAMON M.D.